# Patient Record
Sex: MALE | URBAN - METROPOLITAN AREA
[De-identification: names, ages, dates, MRNs, and addresses within clinical notes are randomized per-mention and may not be internally consistent; named-entity substitution may affect disease eponyms.]

---

## 2023-09-18 ENCOUNTER — OFFICE VISIT (OUTPATIENT)
Dept: PRIMARY CARE | Facility: CLINIC | Age: 58
End: 2023-09-18
Payer: COMMERCIAL

## 2023-09-18 VITALS
TEMPERATURE: 97.2 F | HEART RATE: 66 BPM | HEIGHT: 69 IN | WEIGHT: 213 LBS | BODY MASS INDEX: 31.55 KG/M2 | DIASTOLIC BLOOD PRESSURE: 70 MMHG | SYSTOLIC BLOOD PRESSURE: 128 MMHG | OXYGEN SATURATION: 97 %

## 2023-09-18 DIAGNOSIS — J45.909 MILD ASTHMA, UNSPECIFIED WHETHER COMPLICATED, UNSPECIFIED WHETHER PERSISTENT (HHS-HCC): ICD-10-CM

## 2023-09-18 DIAGNOSIS — H40.1130 PRIMARY OPEN ANGLE GLAUCOMA OF BOTH EYES, UNSPECIFIED GLAUCOMA STAGE: Primary | ICD-10-CM

## 2023-09-18 DIAGNOSIS — Z00.00 HEALTH MAINTENANCE EXAMINATION: ICD-10-CM

## 2023-09-18 DIAGNOSIS — K21.00 GASTROESOPHAGEAL REFLUX DISEASE WITH ESOPHAGITIS WITHOUT HEMORRHAGE: ICD-10-CM

## 2023-09-18 DIAGNOSIS — S46.011D TRAUMATIC TEAR OF RIGHT ROTATOR CUFF, UNSPECIFIED TEAR EXTENT, SUBSEQUENT ENCOUNTER: ICD-10-CM

## 2023-09-18 PROBLEM — S46.011A TRAUMATIC TEAR OF RIGHT ROTATOR CUFF: Status: ACTIVE | Noted: 2023-09-18

## 2023-09-18 PROBLEM — R41.840 CONCENTRATION DEFICIT: Status: ACTIVE | Noted: 2023-09-18

## 2023-09-18 PROBLEM — R41.840 CONCENTRATION DEFICIT: Status: RESOLVED | Noted: 2023-09-18 | Resolved: 2023-09-18

## 2023-09-18 PROBLEM — N20.0 RENAL CALCULI: Status: ACTIVE | Noted: 2023-09-18

## 2023-09-18 PROBLEM — H53.8 BLURRING OF VISION: Status: ACTIVE | Noted: 2023-09-18

## 2023-09-18 PROBLEM — N20.0 RENAL CALCULI: Status: ACTIVE | Noted: 2021-03-08
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
## 2023-09-18 PROBLEM — H53.8 BLURRING OF VISION: Status: RESOLVED | Noted: 2023-09-18 | Resolved: 2023-09-18

## 2023-09-18 PROBLEM — R03.0 ELEVATED BLOOD PRESSURE READING WITHOUT DIAGNOSIS OF HYPERTENSION: Status: ACTIVE | Noted: 2023-09-18

## 2023-09-18 PROBLEM — S06.0XAA CONCUSSION: Status: ACTIVE | Noted: 2023-09-18

## 2023-09-18 PROBLEM — R07.81 RIB PAIN ON LEFT SIDE: Status: RESOLVED | Noted: 2023-09-18 | Resolved: 2023-09-18

## 2023-09-18 PROBLEM — R07.81 RIB PAIN ON LEFT SIDE: Status: ACTIVE | Noted: 2023-09-18

## 2023-09-18 PROBLEM — N20.0 RENAL CALCULI: Status: RESOLVED | Noted: 2021-03-08 | Resolved: 2023-09-18

## 2023-09-18 PROBLEM — R03.0 ELEVATED BLOOD PRESSURE READING WITHOUT DIAGNOSIS OF HYPERTENSION: Status: RESOLVED | Noted: 2023-09-18 | Resolved: 2023-09-18
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
## 2023-09-18 PROBLEM — H40.10X0 OPEN-ANGLE GLAUCOMA: Status: ACTIVE | Noted: 2021-03-08

## 2023-09-18 PROBLEM — S06.0XAA CONCUSSION: Status: RESOLVED | Noted: 2023-09-18 | Resolved: 2023-09-18

## 2023-09-18 PROCEDURE — 99214 OFFICE O/P EST MOD 30 MIN: CPT | Performed by: FAMILY MEDICINE

## 2023-09-18 PROCEDURE — 1036F TOBACCO NON-USER: CPT | Performed by: FAMILY MEDICINE

## 2023-09-18 RX ORDER — OMEPRAZOLE 40 MG/1
40 CAPSULE, DELAYED RELEASE ORAL
COMMUNITY

## 2023-09-18 RX ORDER — MULTIVITAMIN WITH IRON
TABLET ORAL
COMMUNITY

## 2023-09-18 RX ORDER — ASCORBIC ACID 500 MG
500 TABLET ORAL DAILY
COMMUNITY

## 2023-09-18 ASSESSMENT — ENCOUNTER SYMPTOMS
ABDOMINAL DISTENTION: 0
HEMATOLOGIC/LYMPHATIC NEGATIVE: 1
DEPRESSION: 0
DIZZINESS: 0
LOSS OF SENSATION IN FEET: 0
DYSPHORIC MOOD: 0
DIARRHEA: 0
ABDOMINAL PAIN: 0
MYALGIAS: 1
COLOR CHANGE: 0
EYES NEGATIVE: 1
CONSTIPATION: 0
PSYCHIATRIC NEGATIVE: 1
DECREASED CONCENTRATION: 0
ARTHRALGIAS: 1
RESPIRATORY NEGATIVE: 1
PALPITATIONS: 0
HEADACHES: 0
CONSTITUTIONAL NEGATIVE: 1
DIFFICULTY URINATING: 0
NEUROLOGICAL NEGATIVE: 1
ROS GI COMMENTS: GE REFLUX
FREQUENCY: 0
OCCASIONAL FEELINGS OF UNSTEADINESS: 0
CARDIOVASCULAR NEGATIVE: 1

## 2023-09-18 ASSESSMENT — COLUMBIA-SUICIDE SEVERITY RATING SCALE - C-SSRS
2. HAVE YOU ACTUALLY HAD ANY THOUGHTS OF KILLING YOURSELF?: NO
6. HAVE YOU EVER DONE ANYTHING, STARTED TO DO ANYTHING, OR PREPARED TO DO ANYTHING TO END YOUR LIFE?: NO
1. IN THE PAST MONTH, HAVE YOU WISHED YOU WERE DEAD OR WISHED YOU COULD GO TO SLEEP AND NOT WAKE UP?: NO

## 2023-09-18 ASSESSMENT — PATIENT HEALTH QUESTIONNAIRE - PHQ9
SUM OF ALL RESPONSES TO PHQ9 QUESTIONS 1 AND 2: 0
1. LITTLE INTEREST OR PLEASURE IN DOING THINGS: NOT AT ALL
2. FEELING DOWN, DEPRESSED OR HOPELESS: NOT AT ALL
10. IF YOU CHECKED OFF ANY PROBLEMS, HOW DIFFICULT HAVE THESE PROBLEMS MADE IT FOR YOU TO DO YOUR WORK, TAKE CARE OF THINGS AT HOME, OR GET ALONG WITH OTHER PEOPLE: NOT DIFFICULT AT ALL

## 2023-09-18 NOTE — ASSESSMENT & PLAN NOTE
Concerned with intermittent symptoms of difficulty with swallowing going to have you see GI specialist.  Also ordering H. pylori study
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
## 2023-09-18 NOTE — PATIENT INSTRUCTIONS
Going to have you see GI specialist Dr. Rose regarding persistent symptoms of reflux and some difficulty with swallowing.  If any troubles with food getting stuck, any troubles with vomiting please call day or night.    We are going to do H. pylori breath test as well.    Going to have you see shoulder specialist regarding rotator cuff tear history.    Would like for you to schedule time for full physical exam.    Asthma been under good control please continue to follow-up with your pulmonary specialist.    I recommend coming in for full physical.  Lab studies going to be performed including CMP, CBC, lipids, TSH, PSA.

## 2023-09-18 NOTE — ASSESSMENT & PLAN NOTE
This has been under good control you are following up with your pulmonary specialist in [place]

## 2023-10-03 ENCOUNTER — LAB (OUTPATIENT)
Dept: LAB | Facility: LAB | Age: 58
End: 2023-10-03
Payer: COMMERCIAL

## 2023-10-03 DIAGNOSIS — K21.00 GASTROESOPHAGEAL REFLUX DISEASE WITH ESOPHAGITIS WITHOUT HEMORRHAGE: ICD-10-CM

## 2023-10-03 DIAGNOSIS — R73.01 ELEVATED FASTING GLUCOSE: ICD-10-CM

## 2023-10-03 DIAGNOSIS — Z00.00 HEALTH MAINTENANCE EXAMINATION: ICD-10-CM

## 2023-10-03 LAB
ALBUMIN SERPL BCP-MCNC: 4.4 G/DL (ref 3.4–5)
ALP SERPL-CCNC: 70 U/L (ref 33–120)
ALT SERPL W P-5'-P-CCNC: 20 U/L (ref 10–52)
ANION GAP SERPL CALC-SCNC: 12 MMOL/L (ref 10–20)
AST SERPL W P-5'-P-CCNC: 17 U/L (ref 9–39)
BASOPHILS # BLD AUTO: 0.03 X10*3/UL (ref 0–0.1)
BASOPHILS NFR BLD AUTO: 0.5 %
BILIRUB SERPL-MCNC: 1.4 MG/DL (ref 0–1.2)
BUN SERPL-MCNC: 17 MG/DL (ref 6–23)
CALCIUM SERPL-MCNC: 9.5 MG/DL (ref 8.6–10.6)
CHLORIDE SERPL-SCNC: 106 MMOL/L (ref 98–107)
CHOLEST SERPL-MCNC: 223 MG/DL (ref 0–199)
CHOLESTEROL/HDL RATIO: 5.2
CO2 SERPL-SCNC: 26 MMOL/L (ref 21–32)
CREAT SERPL-MCNC: 1.03 MG/DL (ref 0.5–1.3)
EOSINOPHIL # BLD AUTO: 0.18 X10*3/UL (ref 0–0.7)
EOSINOPHIL NFR BLD AUTO: 2.9 %
ERYTHROCYTE [DISTWIDTH] IN BLOOD BY AUTOMATED COUNT: 13.3 % (ref 11.5–14.5)
GFR SERPL CREATININE-BSD FRML MDRD: 85 ML/MIN/1.73M*2
GLUCOSE SERPL-MCNC: 110 MG/DL (ref 74–99)
HCT VFR BLD AUTO: 46.1 % (ref 41–52)
HDLC SERPL-MCNC: 42.6 MG/DL
HGB BLD-MCNC: 15.1 G/DL (ref 13.5–17.5)
IMM GRANULOCYTES # BLD AUTO: 0.04 X10*3/UL (ref 0–0.7)
IMM GRANULOCYTES NFR BLD AUTO: 0.6 % (ref 0–0.9)
LDLC SERPL CALC-MCNC: 141 MG/DL (ref 140–190)
LYMPHOCYTES # BLD AUTO: 2.73 X10*3/UL (ref 1.2–4.8)
LYMPHOCYTES NFR BLD AUTO: 44 %
MCH RBC QN AUTO: 29.4 PG (ref 26–34)
MCHC RBC AUTO-ENTMCNC: 32.8 G/DL (ref 32–36)
MCV RBC AUTO: 90 FL (ref 80–100)
MONOCYTES # BLD AUTO: 0.63 X10*3/UL (ref 0.1–1)
MONOCYTES NFR BLD AUTO: 10.1 %
NEUTROPHILS # BLD AUTO: 2.6 X10*3/UL (ref 1.2–7.7)
NEUTROPHILS NFR BLD AUTO: 41.9 %
NON HDL CHOLESTEROL: 180 MG/DL (ref 0–149)
NRBC BLD-RTO: 0 /100 WBCS (ref 0–0)
PLATELET # BLD AUTO: 178 X10*3/UL (ref 150–450)
PMV BLD AUTO: 10.7 FL (ref 7.5–11.5)
POTASSIUM SERPL-SCNC: 4.4 MMOL/L (ref 3.5–5.3)
PROT SERPL-MCNC: 6.9 G/DL (ref 6.4–8.2)
PSA SERPL-MCNC: 0.58 NG/ML
RBC # BLD AUTO: 5.13 X10*6/UL (ref 4.5–5.9)
SODIUM SERPL-SCNC: 140 MMOL/L (ref 136–145)
TRIGL SERPL-MCNC: 199 MG/DL (ref 0–149)
TSH SERPL-ACNC: 1.89 MIU/L (ref 0.44–3.98)
VLDL: 40 MG/DL (ref 0–40)
WBC # BLD AUTO: 6.2 X10*3/UL (ref 4.4–11.3)

## 2023-10-03 PROCEDURE — 36415 COLL VENOUS BLD VENIPUNCTURE: CPT

## 2023-10-04 DIAGNOSIS — R73.01 ELEVATED FASTING GLUCOSE: ICD-10-CM

## 2023-10-04 LAB — UREA BREATH TEST QL: NEGATIVE

## 2023-10-05 LAB
EST. AVERAGE GLUCOSE BLD GHB EST-MCNC: 108 MG/DL
HBA1C MFR BLD: 5.4 %

## 2023-12-19 ENCOUNTER — APPOINTMENT (OUTPATIENT)
Dept: PRIMARY CARE | Facility: CLINIC | Age: 58
End: 2023-12-19
Payer: COMMERCIAL

## 2024-09-27 ENCOUNTER — APPOINTMENT (OUTPATIENT)
Dept: PRIMARY CARE | Facility: CLINIC | Age: 59
End: 2024-09-27
Payer: COMMERCIAL

## 2024-09-27 VITALS
HEIGHT: 69 IN | HEART RATE: 66 BPM | BODY MASS INDEX: 31.84 KG/M2 | SYSTOLIC BLOOD PRESSURE: 126 MMHG | OXYGEN SATURATION: 99 % | WEIGHT: 215 LBS | DIASTOLIC BLOOD PRESSURE: 82 MMHG | TEMPERATURE: 97.6 F

## 2024-09-27 DIAGNOSIS — E78.5 HYPERLIPIDEMIA, UNSPECIFIED HYPERLIPIDEMIA TYPE: ICD-10-CM

## 2024-09-27 DIAGNOSIS — J45.909 MILD ASTHMA, UNSPECIFIED WHETHER COMPLICATED, UNSPECIFIED WHETHER PERSISTENT (HHS-HCC): ICD-10-CM

## 2024-09-27 DIAGNOSIS — Z01.818 PREOP EXAMINATION: ICD-10-CM

## 2024-09-27 DIAGNOSIS — Z00.00 ROUTINE ADULT HEALTH MAINTENANCE: ICD-10-CM

## 2024-09-27 DIAGNOSIS — H40.9 GLAUCOMA OF BOTH EYES, UNSPECIFIED GLAUCOMA TYPE: ICD-10-CM

## 2024-09-27 DIAGNOSIS — K21.00 GASTROESOPHAGEAL REFLUX DISEASE WITH ESOPHAGITIS WITHOUT HEMORRHAGE: Primary | ICD-10-CM

## 2024-09-27 DIAGNOSIS — H26.9 CATARACT, UNSPECIFIED CATARACT TYPE, UNSPECIFIED LATERALITY: ICD-10-CM

## 2024-09-27 PROBLEM — S46.011A TRAUMATIC TEAR OF RIGHT ROTATOR CUFF: Status: RESOLVED | Noted: 2023-09-18 | Resolved: 2024-09-27

## 2024-09-27 PROCEDURE — 1036F TOBACCO NON-USER: CPT | Performed by: FAMILY MEDICINE

## 2024-09-27 PROCEDURE — 3008F BODY MASS INDEX DOCD: CPT | Performed by: FAMILY MEDICINE

## 2024-09-27 PROCEDURE — 99214 OFFICE O/P EST MOD 30 MIN: CPT | Performed by: FAMILY MEDICINE

## 2024-09-27 RX ORDER — CYANOCOBALAMIN (VITAMIN B-12) 500 MCG
TABLET ORAL DAILY
COMMUNITY

## 2024-09-27 ASSESSMENT — ENCOUNTER SYMPTOMS
EYE ITCHING: 0
SHORTNESS OF BREATH: 0
OCCASIONAL FEELINGS OF UNSTEADINESS: 0
DEPRESSION: 0
FATIGUE: 0
BACK PAIN: 1
CONSTITUTIONAL NEGATIVE: 1
EYE PAIN: 0
PALPITATIONS: 0
CHILLS: 0
EYE REDNESS: 1
CARDIOVASCULAR NEGATIVE: 1
DIARRHEA: 0
CHEST TIGHTNESS: 0
RESPIRATORY NEGATIVE: 1
ARTHRALGIAS: 0
ABDOMINAL PAIN: 0
LOSS OF SENSATION IN FEET: 0

## 2024-09-27 ASSESSMENT — PATIENT HEALTH QUESTIONNAIRE - PHQ9
1. LITTLE INTEREST OR PLEASURE IN DOING THINGS: NOT AT ALL
10. IF YOU CHECKED OFF ANY PROBLEMS, HOW DIFFICULT HAVE THESE PROBLEMS MADE IT FOR YOU TO DO YOUR WORK, TAKE CARE OF THINGS AT HOME, OR GET ALONG WITH OTHER PEOPLE: NOT DIFFICULT AT ALL
SUM OF ALL RESPONSES TO PHQ9 QUESTIONS 1 AND 2: 0
2. FEELING DOWN, DEPRESSED OR HOPELESS: NOT AT ALL

## 2024-09-27 NOTE — ASSESSMENT & PLAN NOTE
Augustine levels are not at goal going to do type II diet.    Coronary CT scoring of the coronary arteries.  You have been hesitant to go on cholesterol-lowering medicine

## 2024-09-27 NOTE — PATIENT INSTRUCTIONS
Having surgery performed.    Will clear for surgery.    Because of the elevation of cholesterol that has been noted I have recommended doing CT scoring of the coronary arteries because of your reluctance to going cholesterol-lowering medicine.  Would like to know further your risk for heart disease.    Your doctor in [place] is recommended blood pressure medication.  Blood pressure today looks good but I would like for you to keep blood pressure diary and follow their instructions.    Please continue to follow-up with GI specialist regarding your reflux.    Asthma is been under good control.    Recommend having physical exam done.    Please make sure when you see your doctor in [place] that you have your prostate health evaluation.  If not we will be happy to help with this

## 2024-09-27 NOTE — PROGRESS NOTES
"Subjective   Patient ID: Gerard Carver is a 59 y.o. male who presents for Pre-op Exam (Eye surgery by Dr. Henriquez on 10/14/24).    Glaucoma.  Pressures.  Dr Henriquez in [place].  Patient having surgery performed for his cataracts but also for his glaucoma.  Going to have stent placement.    Seeing Dr. Jacinto for lens.  Will be following up postop.    Patient has been doing well.  Is having his wellness in [place].    Patient has had no troubles with chest pain or shortness of breath no dizziness no lightheadedness.  No troubles with abdominal pain or discomfort.  No troubles with swelling of the legs or feet.  His cholesterol levels have been elevated he has been speaking to the physician in [place] they had talked about going on cholesterol medicine he is hesitant to do so.  Has been treated for GE reflux when he had significant esophagitis.  Had repeat scope done and things did look good according to him    He has had no troubles with swelling of the legs or feet.  No fever no chills no night sweats.  No chest pain no wheezing no coughing    Occ low back pain.  Some stretching.         Review of Systems   Constitutional: Negative.  Negative for chills and fatigue.   HENT: Negative.  Negative for ear discharge.    Eyes:  Positive for redness and visual disturbance. Negative for pain and itching.   Respiratory: Negative.  Negative for chest tightness and shortness of breath.    Cardiovascular: Negative.  Negative for palpitations and leg swelling.   Gastrointestinal:  Negative for abdominal pain and diarrhea.   Genitourinary:  Negative for enuresis and penile pain.   Musculoskeletal:  Positive for back pain. Negative for arthralgias.       Objective   /80   Pulse 66   Temp 36.4 °C (97.6 °F)   Ht 1.753 m (5' 9\")   Wt 97.5 kg (215 lb)   SpO2 99%   BMI 31.75 kg/m²   BSA Body surface area is 2.18 meters squared.      Physical Exam  Constitutional:       Appearance: Normal appearance.   HENT:      Head: " Normocephalic and atraumatic.      Right Ear: Tympanic membrane normal.      Mouth/Throat:      Mouth: Mucous membranes are dry.   Eyes:      Comments: Conjunctival injection noted in both eyes   Cardiovascular:      Pulses: Normal pulses.      Heart sounds: Normal heart sounds.   Pulmonary:      Effort: Pulmonary effort is normal.      Breath sounds: Normal breath sounds.   Abdominal:      General: Abdomen is flat.      Palpations: Abdomen is soft.   Musculoskeletal:         General: No swelling.   Neurological:      Mental Status: He is alert.       Lab on 10/03/2023   Component Date Value Ref Range Status    WBC 10/03/2023 6.2  4.4 - 11.3 x10*3/uL Final    nRBC 10/03/2023 0.0  0.0 - 0.0 /100 WBCs Final    RBC 10/03/2023 5.13  4.50 - 5.90 x10*6/uL Final    Hemoglobin 10/03/2023 15.1  13.5 - 17.5 g/dL Final    Hematocrit 10/03/2023 46.1  41.0 - 52.0 % Final    MCV 10/03/2023 90  80 - 100 fL Final    MCH 10/03/2023 29.4  26.0 - 34.0 pg Final    MCHC 10/03/2023 32.8  32.0 - 36.0 g/dL Final    RDW 10/03/2023 13.3  11.5 - 14.5 % Final    Platelets 10/03/2023 178  150 - 450 x10*3/uL Final    MPV 10/03/2023 10.7  7.5 - 11.5 fL Final    Neutrophils % 10/03/2023 41.9  40.0 - 80.0 % Final    Immature Granulocytes %, Automated 10/03/2023 0.6  0.0 - 0.9 % Final    Immature Granulocyte Count (IG) includes promyelocytes, myelocytes and metamyelocytes but does not include bands. Percent differential counts (%) should be interpreted in the context of the absolute cell counts (cells/UL).    Lymphocytes % 10/03/2023 44.0  13.0 - 44.0 % Final    Monocytes % 10/03/2023 10.1  2.0 - 10.0 % Final    Eosinophils % 10/03/2023 2.9  0.0 - 6.0 % Final    Basophils % 10/03/2023 0.5  0.0 - 2.0 % Final    Neutrophils Absolute 10/03/2023 2.60  1.20 - 7.70 x10*3/uL Final    Percent differential counts (%) should be interpreted in the context of the absolute cell counts (cells/uL).    Immature Granulocytes Absolute, Au* 10/03/2023 0.04  0.00 -  0.70 x10*3/uL Final    Lymphocytes Absolute 10/03/2023 2.73  1.20 - 4.80 x10*3/uL Final    Monocytes Absolute 10/03/2023 0.63  0.10 - 1.00 x10*3/uL Final    Eosinophils Absolute 10/03/2023 0.18  0.00 - 0.70 x10*3/uL Final    Basophils Absolute 10/03/2023 0.03  0.00 - 0.10 x10*3/uL Final    Glucose 10/03/2023 110 (H)  74 - 99 mg/dL Final    Sodium 10/03/2023 140  136 - 145 mmol/L Final    Potassium 10/03/2023 4.4  3.5 - 5.3 mmol/L Final    Chloride 10/03/2023 106  98 - 107 mmol/L Final    Bicarbonate 10/03/2023 26  21 - 32 mmol/L Final    Anion Gap 10/03/2023 12  10 - 20 mmol/L Final    Urea Nitrogen 10/03/2023 17  6 - 23 mg/dL Final    Creatinine 10/03/2023 1.03  0.50 - 1.30 mg/dL Final    eGFR 10/03/2023 85  >60 mL/min/1.73m*2 Final    Calculations of estimated GFR are performed using the 2021 CKD-EPI Study Refit  equation without the race variable for the IDMS-Traceable Creatinine Methods.    https://jasn.asnjournals.org/content/early/2021/09/22/ASN.9691116288    Calcium 10/03/2023 9.5  8.6 - 10.6 mg/dL Final    Albumin 10/03/2023 4.4  3.4 - 5.0 g/dL Final    Alkaline Phosphatase 10/03/2023 70  33 - 120 U/L Final    Total Protein 10/03/2023 6.9  6.4 - 8.2 g/dL Final    AST 10/03/2023 17  9 - 39 U/L Final    Bilirubin, Total 10/03/2023 1.4 (H)  0.0 - 1.2 mg/dL Final    ALT 10/03/2023 20  10 - 52 U/L Final    Patients treated with Sulfasalazine may generate falsely decreased results for ALT.    Thyroid Stimulating Hormone 10/03/2023 1.89  0.44 - 3.98 mIU/L Final    result    Cholesterol 10/03/2023 223 (H)  0 - 199 mg/dL Final          Age      Desirable   Borderline High   High     0-19 Y     0 - 169       170 - 199     >/= 200    20-24 Y     0 - 189       190 - 224     >/= 225         >24 Y     0 - 199       200 - 239     >/= 240   **All ranges are based on fasting samples. Specific   therapeutic targets will vary based on patient-specific   cardiac risk.    Pediatric guidelines reference:Pediatrics 2011,  128(S5).Adult guidelines reference: NCEP ATPIII Guidelines,SAADIA 2001, 258:2486-97    Venipuncture immediately after or during the administration of Metamizole may lead to falsely low results. Testing should be performed immediately prior to Metamizole dosing.    HDL-Cholesterol 10/03/2023 42.6  mg/dL Final      Age       Very Low   Low     Normal    High    0-19 Y    < 35      < 40     40-45     ----  20-24 Y    ----     < 40      >45      ----        >24 Y      ----     < 40     40-60      >60      Cholesterol/HDL Ratio 10/03/2023 5.2   Final      Ref Values  Desirable  < 3.4  High Risk  > 5.0    LDL Calculated 10/03/2023 141  140 - 190 mg/dL Final                                Near   Borderline      AGE      Desirable  Optimal    High     High     Very High     0-19 Y     0 - 109     ---    110-129   >/= 130     ----    20-24 Y     0 - 119     ---    120-159   >/= 160     ----      >24 Y     0 -  99   100-129  130-159   160-189     >/=190      VLDL 10/03/2023 40  0 - 40 mg/dL Final    Triglycerides 10/03/2023 199 (H)  0 - 149 mg/dL Final       Age         Desirable   Borderline High   High     Very High   0 D-90 D    19 - 174         ----         ----        ----  91 D- 9 Y     0 -  74        75 -  99     >/= 100      ----    10-19 Y     0 -  89        90 - 129     >/= 130      ----    20-24 Y     0 - 114       115 - 149     >/= 150      ----         >24 Y     0 - 149       150 - 199    200- 499    >/= 500    Venipuncture immediately after or during the administration of Metamizole may lead to falsely low results. Testing should be performed immediately prior to Metamizole dosing.    Non HDL Cholesterol 10/03/2023 180 (H)  0 - 149 mg/dL Final          Age       Desirable   Borderline High   High     Very High     0-19 Y     0 - 119       120 - 144     >/= 145    >/= 160    20-24 Y     0 - 149       150 - 189     >/= 190      ----         >24 Y    30 mg/dL above LDL Cholesterol goal      Prostate Specific AG  10/03/2023 0.58  <=4.00 ng/mL Final    result    H. pylori UBiT 10/03/2023 Negative  Negative Final    ANTIMICROBIALS, PROTON PUMP INHIBITORS, AND BISMUTH   PREPARATIONS ARE KNOWN TO SUPPRESS H. PYLORI AND   INGESTION OF THESE WITHIN TWO WEEKS PRIOR TO PERFORMING   THE UREA BREATH TEST MAY GIVE FALSE NEGATIVE RESULTS.    POST-TREATMENT MONITORING OF H. PYLORI SHOULD BE PERFORMED   AT LEAST SIX WEEKS AFTER THE END OF TREATMENT. EARLIER   ASSESSMENTS MAY GIVE FALSE RESULTS.    Hemoglobin A1C 10/03/2023 5.4  see below % Final    Estimated Average Glucose 10/03/2023 108  Not Established mg/dL Final     Current Outpatient Medications on File Prior to Visit   Medication Sig Dispense Refill    ascorbic acid (Vitamin C) 500 mg tablet Take 1 tablet (500 mg) by mouth once daily.      ergocalciferol, vitamin D2, (VITAMIN D2 ORAL) Take by mouth.      melatonin 1 mg tablet Take by mouth once daily.      multivitamin (Multiple Vitamins) tablet Take by mouth.      omeprazole (PriLOSEC) 40 mg DR capsule Take 1 capsule (40 mg) by mouth. Do not crush or chew.       No current facility-administered medications on file prior to visit.     No images are attached to the encounter.            Assessment/Plan   Problem List Items Addressed This Visit             ICD-10-CM    Asthma ([facility]) J45.909     Doing well.         Gastroesophageal reflux disease with esophagitis - Primary K21.00     Has had multiple scopes had been on proton IM pump inhibitor         Cataract H26.9     Having surgery performed.         Glaucoma of both eyes H40.9     Having surgery performed         Preop examination Z01.818     Overall doing well clearing for surgery         Hyperlipidemia E78.5     Augustine levels are not at goal going to do type II diet.    Coronary CT scoring of the coronary arteries.  You have been hesitant to go on cholesterol-lowering medicine

## 2025-03-11 ENCOUNTER — HOSPITAL ENCOUNTER (OUTPATIENT)
Dept: RADIOLOGY | Facility: CLINIC | Age: 60
Discharge: HOME | End: 2025-03-11

## 2025-03-11 DIAGNOSIS — Z00.00 ROUTINE ADULT HEALTH MAINTENANCE: ICD-10-CM

## 2025-03-11 PROCEDURE — 75571 CT HRT W/O DYE W/CA TEST: CPT

## 2025-06-27 ENCOUNTER — APPOINTMENT (OUTPATIENT)
Dept: PRIMARY CARE | Facility: CLINIC | Age: 60
End: 2025-06-27
Payer: COMMERCIAL

## 2025-06-27 VITALS
OXYGEN SATURATION: 97 % | BODY MASS INDEX: 32.22 KG/M2 | TEMPERATURE: 98.4 F | HEIGHT: 69 IN | HEART RATE: 82 BPM | DIASTOLIC BLOOD PRESSURE: 83 MMHG | WEIGHT: 217.5 LBS | SYSTOLIC BLOOD PRESSURE: 134 MMHG

## 2025-06-27 DIAGNOSIS — J45.909 MILD ASTHMA, UNSPECIFIED WHETHER COMPLICATED, UNSPECIFIED WHETHER PERSISTENT (HHS-HCC): ICD-10-CM

## 2025-06-27 DIAGNOSIS — Z00.00 ENCOUNTER FOR ANNUAL GENERAL MEDICAL EXAMINATION WITHOUT ABNORMAL FINDINGS IN ADULT: ICD-10-CM

## 2025-06-27 DIAGNOSIS — Z12.5 ENCOUNTER FOR SCREENING PROSTATE SPECIFIC ANTIGEN (PSA) MEASUREMENT: Primary | ICD-10-CM

## 2025-06-27 DIAGNOSIS — R73.9 ELEVATED BLOOD SUGAR: ICD-10-CM

## 2025-06-27 DIAGNOSIS — E78.5 HYPERLIPIDEMIA, UNSPECIFIED HYPERLIPIDEMIA TYPE: ICD-10-CM

## 2025-06-27 DIAGNOSIS — H40.1130 PRIMARY OPEN ANGLE GLAUCOMA OF BOTH EYES, UNSPECIFIED GLAUCOMA STAGE: ICD-10-CM

## 2025-06-27 PROCEDURE — 3008F BODY MASS INDEX DOCD: CPT | Performed by: FAMILY MEDICINE

## 2025-06-27 PROCEDURE — 1036F TOBACCO NON-USER: CPT | Performed by: FAMILY MEDICINE

## 2025-06-27 PROCEDURE — 99214 OFFICE O/P EST MOD 30 MIN: CPT | Performed by: FAMILY MEDICINE

## 2025-06-27 ASSESSMENT — ENCOUNTER SYMPTOMS
GASTROINTESTINAL NEGATIVE: 1
BLOOD IN STOOL: 0
OCCASIONAL FEELINGS OF UNSTEADINESS: 0
CHOKING: 0
FATIGUE: 0
CHILLS: 0
ABDOMINAL PAIN: 0
FREQUENCY: 0
HEMATURIA: 0
PALPITATIONS: 0
ACTIVITY CHANGE: 0
SHORTNESS OF BREATH: 0
POLYPHAGIA: 0
HEADACHES: 0
UNEXPECTED WEIGHT CHANGE: 0
DYSPHORIC MOOD: 0
LOSS OF SENSATION IN FEET: 0
CONFUSION: 0
DEPRESSION: 0
SPEECH DIFFICULTY: 0

## 2025-06-27 ASSESSMENT — PATIENT HEALTH QUESTIONNAIRE - PHQ9
2. FEELING DOWN, DEPRESSED OR HOPELESS: NOT AT ALL
1. LITTLE INTEREST OR PLEASURE IN DOING THINGS: NOT AT ALL
SUM OF ALL RESPONSES TO PHQ9 QUESTIONS 1 AND 2: 0

## 2025-06-27 NOTE — PROGRESS NOTES
"Subjective   Patient ID: Gerard Carver is a 60 y.o. male who presents for Follow-up (Discuss cardiology results. ).    Optical implant.     Vision trouble with R eye.  Top letter eye chart.  Trabeculectamy.  Dr. Mantilla patient had difficult time after having eye surgery.  He has lost his vision in the right eye.    Ended up having troubles with glaucoma as well and had to do surgery for this to still the vision has not come back its made it difficult for him to comprehend sometimes what he is reading.  He is looking to try to obtain a disability.  Has had no troubles with headache or double vision has had blurring of vision of the right eye.    He said no chest pain or shortness of breath no abdominal pain or discomfort does a lot of his work in [employer].    Will be traveling back there is going to have his lab work done there is less CT scoring of the coronary arteries so the CT scoring of 0 he said no chest pain    Hyperbaric therapy.    R eye.     More yard work.               Review of Systems   Constitutional:  Negative for activity change, chills, fatigue and unexpected weight change.   HENT: Negative.     Eyes:  Positive for visual disturbance.   Respiratory:  Negative for choking and shortness of breath.    Cardiovascular:  Negative for chest pain and palpitations.   Gastrointestinal: Negative.  Negative for abdominal pain and blood in stool.   Endocrine: Negative for heat intolerance and polyphagia.   Genitourinary:  Negative for frequency, hematuria and testicular pain.        Up once at night   Neurological:  Negative for speech difficulty and headaches.   Psychiatric/Behavioral:  Negative for confusion and dysphoric mood.        Objective   /90   Pulse 82   Temp 36.9 °C (98.4 °F) (Oral)   Ht 1.753 m (5' 9\")   Wt 98.7 kg (217 lb 8 oz)   SpO2 97%   BMI 32.12 kg/m²   BSA Body surface area is 2.19 meters squared.      Physical Exam  HENT:      Head: Normocephalic.   Cardiovascular:      Rate and " Rhythm: Normal rate.      Pulses: Normal pulses.      Heart sounds: Normal heart sounds.   Pulmonary:      Effort: Pulmonary effort is normal.      Breath sounds: Normal breath sounds.   Abdominal:      General: Abdomen is flat.      Palpations: Abdomen is soft.   Musculoskeletal:         General: No swelling.   Skin:     Coloration: Skin is not jaundiced.   Neurological:      Mental Status: He is alert.       No visits with results within 1 Year(s) from this visit.   Latest known visit with results is:   Lab on 10/03/2023   Component Date Value Ref Range Status    WBC 10/03/2023 6.2  4.4 - 11.3 x10*3/uL Final    nRBC 10/03/2023 0.0  0.0 - 0.0 /100 WBCs Final    RBC 10/03/2023 5.13  4.50 - 5.90 x10*6/uL Final    Hemoglobin 10/03/2023 15.1  13.5 - 17.5 g/dL Final    Hematocrit 10/03/2023 46.1  41.0 - 52.0 % Final    MCV 10/03/2023 90  80 - 100 fL Final    MCH 10/03/2023 29.4  26.0 - 34.0 pg Final    MCHC 10/03/2023 32.8  32.0 - 36.0 g/dL Final    RDW 10/03/2023 13.3  11.5 - 14.5 % Final    Platelets 10/03/2023 178  150 - 450 x10*3/uL Final    MPV 10/03/2023 10.7  7.5 - 11.5 fL Final    Neutrophils % 10/03/2023 41.9  40.0 - 80.0 % Final    Immature Granulocytes %, Automated 10/03/2023 0.6  0.0 - 0.9 % Final    Immature Granulocyte Count (IG) includes promyelocytes, myelocytes and metamyelocytes but does not include bands. Percent differential counts (%) should be interpreted in the context of the absolute cell counts (cells/UL).    Lymphocytes % 10/03/2023 44.0  13.0 - 44.0 % Final    Monocytes % 10/03/2023 10.1  2.0 - 10.0 % Final    Eosinophils % 10/03/2023 2.9  0.0 - 6.0 % Final    Basophils % 10/03/2023 0.5  0.0 - 2.0 % Final    Neutrophils Absolute 10/03/2023 2.60  1.20 - 7.70 x10*3/uL Final    Percent differential counts (%) should be interpreted in the context of the absolute cell counts (cells/uL).    Immature Granulocytes Absolute, Au* 10/03/2023 0.04  0.00 - 0.70 x10*3/uL Final    Lymphocytes Absolute  10/03/2023 2.73  1.20 - 4.80 x10*3/uL Final    Monocytes Absolute 10/03/2023 0.63  0.10 - 1.00 x10*3/uL Final    Eosinophils Absolute 10/03/2023 0.18  0.00 - 0.70 x10*3/uL Final    Basophils Absolute 10/03/2023 0.03  0.00 - 0.10 x10*3/uL Final    Glucose 10/03/2023 110 (H)  74 - 99 mg/dL Final    Sodium 10/03/2023 140  136 - 145 mmol/L Final    Potassium 10/03/2023 4.4  3.5 - 5.3 mmol/L Final    Chloride 10/03/2023 106  98 - 107 mmol/L Final    Bicarbonate 10/03/2023 26  21 - 32 mmol/L Final    Anion Gap 10/03/2023 12  10 - 20 mmol/L Final    Urea Nitrogen 10/03/2023 17  6 - 23 mg/dL Final    Creatinine 10/03/2023 1.03  0.50 - 1.30 mg/dL Final    eGFR 10/03/2023 85  >60 mL/min/1.73m*2 Final    Calculations of estimated GFR are performed using the 2021 CKD-EPI Study Refit  equation without the race variable for the IDMS-Traceable Creatinine Methods.    https://jasn.asnjournals.org/content/early/2021/09/22/ASN.2605821632    Calcium 10/03/2023 9.5  8.6 - 10.6 mg/dL Final    Albumin 10/03/2023 4.4  3.4 - 5.0 g/dL Final    Alkaline Phosphatase 10/03/2023 70  33 - 120 U/L Final    Total Protein 10/03/2023 6.9  6.4 - 8.2 g/dL Final    AST 10/03/2023 17  9 - 39 U/L Final    Bilirubin, Total 10/03/2023 1.4 (H)  0.0 - 1.2 mg/dL Final    ALT 10/03/2023 20  10 - 52 U/L Final    Patients treated with Sulfasalazine may generate falsely decreased results for ALT.    Thyroid Stimulating Hormone 10/03/2023 1.89  0.44 - 3.98 mIU/L Final    result    Cholesterol 10/03/2023 223 (H)  0 - 199 mg/dL Final          Age      Desirable   Borderline High   High     0-19 Y     0 - 169       170 - 199     >/= 200    20-24 Y     0 - 189       190 - 224     >/= 225         >24 Y     0 - 199       200 - 239     >/= 240   **All ranges are based on fasting samples. Specific   therapeutic targets will vary based on patient-specific   cardiac risk.    Pediatric guidelines reference:Pediatrics 2011, 128(S5).Adult guidelines reference: NCEP ATPIII  Guidelines,SAADIA 2001, 258:2486-97    Venipuncture immediately after or during the administration of Metamizole may lead to falsely low results. Testing should be performed immediately prior to Metamizole dosing.    HDL-Cholesterol 10/03/2023 42.6  mg/dL Final      Age       Very Low   Low     Normal    High    0-19 Y    < 35      < 40     40-45     ----  20-24 Y    ----     < 40      >45      ----        >24 Y      ----     < 40     40-60      >60      Cholesterol/HDL Ratio 10/03/2023 5.2   Final      Ref Values  Desirable  < 3.4  High Risk  > 5.0    LDL Calculated 10/03/2023 141  140 - 190 mg/dL Final                                Near   Borderline      AGE      Desirable  Optimal    High     High     Very High     0-19 Y     0 - 109     ---    110-129   >/= 130     ----    20-24 Y     0 - 119     ---    120-159   >/= 160     ----      >24 Y     0 -  99   100-129  130-159   160-189     >/=190      VLDL 10/03/2023 40  0 - 40 mg/dL Final    Triglycerides 10/03/2023 199 (H)  0 - 149 mg/dL Final       Age         Desirable   Borderline High   High     Very High   0 D-90 D    19 - 174         ----         ----        ----  91 D- 9 Y     0 -  74        75 -  99     >/= 100      ----    10-19 Y     0 -  89        90 - 129     >/= 130      ----    20-24 Y     0 - 114       115 - 149     >/= 150      ----         >24 Y     0 - 149       150 - 199    200- 499    >/= 500    Venipuncture immediately after or during the administration of Metamizole may lead to falsely low results. Testing should be performed immediately prior to Metamizole dosing.    Non HDL Cholesterol 10/03/2023 180 (H)  0 - 149 mg/dL Final          Age       Desirable   Borderline High   High     Very High     0-19 Y     0 - 119       120 - 144     >/= 145    >/= 160    20-24 Y     0 - 149       150 - 189     >/= 190      ----         >24 Y    30 mg/dL above LDL Cholesterol goal      Prostate Specific AG 10/03/2023 0.58  <=4.00 ng/mL Final    result    H.  pylori UBiT 10/03/2023 Negative  Negative Final    ANTIMICROBIALS, PROTON PUMP INHIBITORS, AND BISMUTH   PREPARATIONS ARE KNOWN TO SUPPRESS H. PYLORI AND   INGESTION OF THESE WITHIN TWO WEEKS PRIOR TO PERFORMING   THE UREA BREATH TEST MAY GIVE FALSE NEGATIVE RESULTS.    POST-TREATMENT MONITORING OF H. PYLORI SHOULD BE PERFORMED   AT LEAST SIX WEEKS AFTER THE END OF TREATMENT. EARLIER   ASSESSMENTS MAY GIVE FALSE RESULTS.    Hemoglobin A1C 10/03/2023 5.4  see below % Final    Estimated Average Glucose 10/03/2023 108  Not Established mg/dL Final     Medications Ordered Prior to Encounter[1]  No images are attached to the encounter.            Assessment/Plan   Problem List Items Addressed This Visit           ICD-10-CM    Asthma J45.909    Doing well no symptoms of asthma         Open-angle glaucoma H40.10X0    Treated and being followed by eye specialist         Hyperlipidemia E78.5    Going to recheck labs CT scoring the coronary arteries was 0          Other Visit Diagnoses         Codes      Encounter for screening prostate specific antigen (PSA) measurement    -  Primary Z12.5    Relevant Orders    PSA      Encounter for annual general medical examination without abnormal findings in adult     Z00.00    Relevant Orders    CBC and Auto Differential    Comprehensive Metabolic Panel    TSH with reflex to Free T4 if abnormal    Lipid Panel      Elevated blood sugar     R73.9    Relevant Orders    Hemoglobin A1C                    [1]   Current Outpatient Medications on File Prior to Visit   Medication Sig Dispense Refill    ascorbic acid (Vitamin C) 500 mg tablet Take 1 tablet (500 mg) by mouth once daily.      ergocalciferol, vitamin D2, (VITAMIN D2 ORAL) Take by mouth.      melatonin 1 mg tablet Take by mouth once daily.      multivitamin (Multiple Vitamins) tablet Take by mouth.      omeprazole (PriLOSEC) 40 mg DR capsule Take 1 capsule (40 mg) by mouth. Do not crush or chew.      timolol (Betimol) 0.25 %  ophthalmic solution 1-2 drops twice a day.       No current facility-administered medications on file prior to visit.

## 2025-06-27 NOTE — PATIENT INSTRUCTIONS
Reviewed CT scoring of the coronary arteries this was the required heavy perform lab studies please send a copy of this to me please keep track of blood pressure readings like this the top number stays below 135 and the bottom number below 85 on regular basis    These remain elevated may benefit from being on medication for this.    Would like to have follow-up in the next 3 months and a copy of the blood pressure diary.  Please send a copy of the lab work and blood pressure diary through the email which is [email or web address]

## 2025-10-30 ENCOUNTER — APPOINTMENT (OUTPATIENT)
Dept: PRIMARY CARE | Facility: CLINIC | Age: 60
End: 2025-10-30
Payer: COMMERCIAL